# Patient Record
Sex: MALE | Race: WHITE | Employment: FULL TIME | URBAN - NONMETROPOLITAN AREA
[De-identification: names, ages, dates, MRNs, and addresses within clinical notes are randomized per-mention and may not be internally consistent; named-entity substitution may affect disease eponyms.]

---

## 2022-04-22 ENCOUNTER — OFFICE VISIT (OUTPATIENT)
Age: 22
End: 2022-04-22

## 2022-04-22 VITALS
SYSTOLIC BLOOD PRESSURE: 136 MMHG | BODY MASS INDEX: 40.31 KG/M2 | TEMPERATURE: 98 F | HEIGHT: 72 IN | RESPIRATION RATE: 22 BRPM | WEIGHT: 297.6 LBS | DIASTOLIC BLOOD PRESSURE: 88 MMHG | OXYGEN SATURATION: 99 % | HEART RATE: 81 BPM

## 2022-04-22 DIAGNOSIS — A08.4 VIRAL GASTROENTERITIS: Primary | ICD-10-CM

## 2022-04-22 DIAGNOSIS — G43.009 MIGRAINE WITHOUT AURA AND WITHOUT STATUS MIGRAINOSUS, NOT INTRACTABLE: ICD-10-CM

## 2022-04-22 PROCEDURE — 99203 OFFICE O/P NEW LOW 30 MIN: CPT | Performed by: NURSE PRACTITIONER

## 2022-04-22 ASSESSMENT — ENCOUNTER SYMPTOMS
VOMITING: 1
COLOR CHANGE: 0
WHEEZING: 0
SORE THROAT: 0
NAUSEA: 1
EYE ITCHING: 0
BLOOD IN STOOL: 0
CONSTIPATION: 0
EYE DISCHARGE: 0
ABDOMINAL PAIN: 0
SHORTNESS OF BREATH: 0
COUGH: 0
RHINORRHEA: 0
SINUS PRESSURE: 0
DIARRHEA: 0

## 2022-04-22 NOTE — PATIENT INSTRUCTIONS
Patient Education        Migraine Headache: Care Instructions  Overview     Migraines are painful, throbbing headaches that often start on one side of the head. They may cause nausea and vomiting and make you sensitive to light,sound, or smell. Without treatment, migraines can last from 4 hours to a few days. Medicines can help prevent migraines or stop them after they have started. Your doctor canhelp you find which ones work best for you. Follow-up care is a key part of your treatment and safety. Be sure to make and go to all appointments, and call your doctor if you are having problems. It's also a good idea to know your test results and keep alist of the medicines you take. How can you care for yourself at home?  Do not drive if you have taken a prescription pain medicine.  Rest in a quiet, dark room until your headache is gone. Close your eyes, and try to relax or go to sleep. Don't watch TV or read.  Put a cold, moist cloth or cold pack on the painful area for 10 to 20 minutes at a time. Put a thin cloth between the cold pack and your skin.  Use a warm, moist towel or a heating pad set on low to relax tight shoulder and neck muscles.  Have someone gently massage your neck and shoulders.  Take your medicines exactly as prescribed. Call your doctor if you think you are having a problem with your medicine. You will get more details on the specific medicines your doctor prescribes.  Don't take medicine for headache pain too often. Talk to your doctor if you are taking medicine more than 2 days a week to stop a headache. Taking too much pain medicine can lead to more headaches. These are called medicine-overuse headaches. To prevent migraines   Keep a headache diary so you can figure out what triggers your headaches. Avoiding triggers may help you prevent headaches. Record when each headache began, how long it lasted, and what the pain was like.  Write down any other symptoms you had with the headache, such as nausea, flashing lights or dark spots, or sensitivity to bright light or loud noise. Note if the headache occurred near your period. List anything that might have triggered the headache. Triggers may include certain foods (chocolate, cheese, wine) or odors, smoke, bright light, stress, or lack of sleep.  If your doctor has prescribed medicine for your migraines, take it as directed. You may have medicine that you take only when you get a migraine and medicine that you take all the time to help prevent migraines. ? If your doctor has prescribed medicine for when you get a headache, take it at the first sign of a migraine, unless your doctor has given you other instructions. ? If your doctor has prescribed medicine to prevent migraines, take it exactly as prescribed. Call your doctor if you think you are having a problem with your medicine.  Find healthy ways to deal with stress. Migraines are most common during or right after stressful times. Try finding ways to reduce stress like practicing mindfulness or deep breathing exercises.  Get plenty of sleep and exercise. But be careful to not push yourself too hard during exercise. It may trigger a headache.  Eat meals on a regular schedule. Avoid foods and drinks that often trigger migraines. These include chocolate, alcohol (especially red wine and port), aspartame, monosodium glutamate (MSG), and some additives found in foods (such as hot dogs, doyle, cold cuts, aged cheeses, and pickled foods).  Limit caffeine. Don't drink too much coffee, tea, or soda. But don't quit caffeine suddenly. That can also give you migraines.  Do not smoke or allow others to smoke around you. If you need help quitting, talk to your doctor about stop-smoking programs and medicines. These can increase your chances of quitting for good.    If you are taking birth control pills or hormone therapy, talk to your doctor about whether they are triggering your migraines. When should you call for help? Call 911 anytime you think you may need emergency care. For example, call if:     You have signs of a stroke. These may include:  ? Sudden numbness, paralysis, or weakness in your face, arm, or leg, especially on only one side of your body. ? Sudden vision changes. ? Sudden trouble speaking. ? Sudden confusion or trouble understanding simple statements. ? Sudden problems with walking or balance. ? A sudden, severe headache that is different from past headaches. Call your doctor now or seek immediate medical care if:     You have new or worse nausea and vomiting.      You have a new or higher fever.      Your headache gets much worse. Watch closely for changes in your health, and be sure to contact your doctor if:     You are not getting better after 2 days (48 hours). Where can you learn more? Go to https://ScoutmobpesimpleFLOORSeweb.InHiro. org and sign in to your [employer] account. Enter Y982 in the Yard Club box to learn more about \"Migraine Headache: Care Instructions. \"     If you do not have an account, please click on the \"Sign Up Now\" link. Current as of: December 13, 2021               Content Version: 13.2  © 5858-4273 [employer]. Care instructions adapted under license by [facility] ([facility]). If you have questions about a medical condition or this instruction, always ask your healthcare professional. [place] [address] any warranty or liability for your use of this information. Patient Education        Gastroenteritis: Care Instructions  Overview     Gastroenteritis is an illness that may cause nausea, vomiting, and diarrhea. Itcan be caused by bacteria or a virus. You will probably begin to feel better in 1 to 2 days. In the meantime, get plenty of rest and make sure you do not become dehydrated. Dehydration occurswhen your body loses too much fluid.   Follow-up care is a key part of your treatment and safety. Be sure to make and go to all appointments, and call your doctor if you are having problems. It's also a good idea to know your test results and keep alist of the medicines you take. How can you care for yourself at home?  If your doctor prescribed antibiotics, take them as directed. Do not stop taking them just because you feel better. You need to take the full course of antibiotics.  Drink plenty of fluids to prevent dehydration. Choose water and other clear liquids until you feel better. If you have kidney, heart, or liver disease and have to limit fluids, talk with your doctor before you increase your fluid intake.  Drink fluids slowly, in frequent, small amounts, because drinking too much too fast can cause vomiting.  Begin eating mild foods, such as dry toast, yogurt, applesauce, bananas, and rice. Avoid spicy, hot, or high-fat foods, and do not drink alcohol or caffeine for a day or two. Do not drink milk or eat ice cream until you are feeling better. How to prevent gastroenteritis   Keep hot foods hot and cold foods cold.  Do not eat meats, dressings, salads, or other foods that have been kept at room temperature for more than 2 hours.  Use a thermometer to check your refrigerator. It should be between 34°F and 40°F.   Defrost meats in the refrigerator or microwave, not on the kitchen counter.  Keep your hands and your kitchen clean. Wash your hands, cutting boards, and countertops with hot soapy water frequently.  Cook meat until it is well done.  Do not eat raw eggs or uncooked sauces made with raw eggs.  Do not take chances. If food looks or tastes spoiled, throw it out. When should you call for help? Call 911 anytime you think you may need emergency care. For example, call if:     You vomit blood or what looks like coffee grounds.      You passed out (lost consciousness).      You pass maroon or very bloody stools.    Call your doctor now or seek immediate medical care if:     You have severe belly pain.      You have signs of needing more fluids. You have sunken eyes, a dry mouth, and pass only a little urine.      You feel like you are going to faint.      You have increased belly pain that does not go away in 1 to 2 days.      You have new or increased nausea, or you are vomiting.      You have a new or higher fever.      Your stools are black and tarlike or have streaks of blood. Watch closely for changes in your health, and be sure to contact your doctor if:     You are dizzy or lightheaded.      You urinate less than usual, or your urine is dark yellow or brown.      You do not feel better with each day that goes by. Where can you learn more? Go to https://Tasqe.[employer]. org and sign in to your Cartagenia account. Enter N142 in the Protagen box to learn more about \"Gastroenteritis: Care Instructions. \"     If you do not have an account, please click on the \"Sign Up Now\" link. Current as of: July 1, 2021               Content Version: 13.2  © 9882-8799 [employer]. Care instructions adapted under license by [facility] ([facility]). If you have questions about a medical condition or this instruction, always ask your healthcare professional. [address] any warranty or liability for your use of this information.       - Take excedrin as needed for headache  - If applicable, do not take any medication like immodium to stop diarrhea. It needs to run its course. - Take clear liquids until no more vomiting for 4-6 hours  - Advance to BRAT (bananas, rice, applesauce and toast) as tolerated.    - If patient is not improving or developing any new/worsening symptoms then return to clinic as needed or follow up with PCP

## 2022-04-22 NOTE — LETTER
[facility]  [id number]  Phone: [phone]  Fax: LEISA Rosario - CNP        April 22, 2022     Patient: Desire Dahl   YOB: 2000   Date of Visit: 4/22/2022       To Whom it May Concern:    Desire Dahl was seen in my clinic on 4/22/2022. He may return to work on 4/23/2022. If you have any questions or concerns, please don't hesitate to call.     Sincerely,         LEISA Gutiérrez - CNP

## 2022-04-22 NOTE — PROGRESS NOTES
[id number]  [address] [facility] [id number]  Dept: [phone]  Dept Fax: [phone]  Loc: [phone]    Gustabo Finley is a 23 y.o. male who presents today for his medical conditions/complaints as noted below. Gustabo Finley is complaining of Emesis and Headache (patient states he just took 1000 mg ibuprofen 2 hours ago)        HPI:   Migraine   This is a new problem. The current episode started in the past 7 days (3 days ago). The problem occurs constantly. The problem has been resolved. The pain is located in the right unilateral region. The pain does not radiate. The pain quality is similar to prior headaches. The quality of the pain is described as throbbing. The pain is moderate. Associated symptoms include nausea and vomiting. Pertinent negatives include no abdominal pain, coughing, dizziness, ear pain, fever, numbness, rhinorrhea, sinus pressure, sore throat or weakness. The symptoms are aggravated by bright light. He has tried acetaminophen and NSAIDs for the symptoms. The treatment provided mild relief. Nausea & Vomiting  This is a new problem. The current episode started in the past 7 days (3 days ago). The problem occurs 2 to 4 times per day. The problem has been resolved. Associated symptoms include headaches, nausea and vomiting. Pertinent negatives include no abdominal pain, chest pain, chills, congestion, coughing, fatigue, fever, myalgias, numbness, rash, sore throat or weakness. The symptoms are aggravated by eating. He has tried rest for the symptoms. The treatment provided mild relief. Symptoms have all resolved; patient just needs an excuse to return to work on Monday. Past Medical History:   Diagnosis Date    Headache        Past Surgical History:   Procedure Laterality Date    ARM SURGERY         History reviewed. No pertinent family history.     Social History     Tobacco Use    Smoking status: Never Smoker  Smokeless tobacco: Current User     Types: Snuff   Substance Use Topics    Alcohol use: Yes        No current outpatient medications on file. No current facility-administered medications for this visit. Allergies   Allergen Reactions    Cefaclor Other (See Comments)     Reaction occurred as a child, does not remember       Health Maintenance   Topic Date Due    Varicella vaccine (1 of 2 - 2-dose childhood series) Never done    COVID-19 Vaccine (1) Never done    HPV vaccine (1 - Male 2-dose series) Never done    Depression Screen  Never done    HIV screen  Never done    Hepatitis C screen  Never done    DTaP/Tdap/Td vaccine (1 - Tdap) Never done    Flu vaccine (Season Ended) 09/01/2022    Hepatitis A vaccine  Aged Out    Hepatitis B vaccine  Aged Out    Hib vaccine  Aged Out    Meningococcal (ACWY) vaccine  Aged Out    Pneumococcal 0-64 years Vaccine  Aged Out       Subjective:   Review of Systems   Constitutional: Negative for activity change, appetite change, chills, fatigue and fever. HENT: Negative for congestion, ear pain, rhinorrhea, sinus pressure and sore throat. Eyes: Negative for discharge and itching. Respiratory: Negative for cough, shortness of breath and wheezing. Cardiovascular: Negative for chest pain. Gastrointestinal: Positive for nausea and vomiting. Negative for abdominal pain, blood in stool, constipation and diarrhea. Musculoskeletal: Negative for myalgias. Skin: Negative for color change and rash. Neurological: Positive for headaches. Negative for dizziness, syncope, facial asymmetry, speech difficulty, weakness, light-headedness and numbness. All other systems reviewed and are negative. Objective    Physical Exam  Vitals and nursing note reviewed. Constitutional:       General: He is not in acute distress. Appearance: Normal appearance. HENT:      Head: Normocephalic and atraumatic.       Mouth/Throat:      Mouth: Mucous membranes are moist.      Pharynx: No posterior oropharyngeal erythema. Eyes:      Extraocular Movements: Extraocular movements intact. Pupils: Pupils are equal, round, and reactive to light. Cardiovascular:      Rate and Rhythm: Normal rate and regular rhythm. Pulses: Normal pulses. Heart sounds: Normal heart sounds. No murmur heard. Pulmonary:      Effort: Pulmonary effort is normal. No respiratory distress. Breath sounds: Normal breath sounds. No wheezing. Abdominal:      General: Bowel sounds are normal.      Palpations: Abdomen is soft. Tenderness: There is no abdominal tenderness. There is no guarding or rebound. Skin:     General: Skin is warm. Capillary Refill: Capillary refill takes less than 2 seconds. Coloration: Skin is not pale. Findings: No rash. Neurological:      General: No focal deficit present. Mental Status: He is alert and oriented to person, place, and time. Psychiatric:         Attention and Perception: Attention normal.         Mood and Affect: Mood normal.         Behavior: Behavior normal. Behavior is cooperative. Thought Content: Thought content normal.         /88   Pulse 81   Temp 98 °F (36.7 °C)   Resp 22   Ht 6' (1.829 m)   Wt 297 lb 9.6 oz (135 kg)   SpO2 99%   BMI 40.36 kg/m²     Assessment         Diagnosis Orders   1. Viral gastroenteritis     2. Migraine without aura and without status migrainosus, not intractable         Plan   - Take excedrin as needed for headache  - If applicable, do not take any medication like immodium to stop diarrhea. It needs to run its course. - Take clear liquids until no more vomiting for 4-6 hours  - Advance to BRAT (bananas, rice, applesauce and toast) as tolerated. - If patient is not improving or developing any new/worsening symptoms then return to clinic as needed or follow up with PCP    Return if symptoms worsen or fail to improve.          Discussed use, benefits, and side effects of any prescribed medications. All patient questions were answered. Patient voiced understanding of care plan. Patient was given educational materials - see patient instructions below. Patient Instructions       Patient Education        Migraine Headache: Care Instructions  Overview     Migraines are painful, throbbing headaches that often start on one side of the head. They may cause nausea and vomiting and make you sensitive to light,sound, or smell. Without treatment, migraines can last from 4 hours to a few days. Medicines can help prevent migraines or stop them after they have started. Your doctor canhelp you find which ones work best for you. Follow-up care is a key part of your treatment and safety. Be sure to make and go to all appointments, and call your doctor if you are having problems. It's also a good idea to know your test results and keep alist of the medicines you take. How can you care for yourself at home?  Do not drive if you have taken a prescription pain medicine.  Rest in a quiet, dark room until your headache is gone. Close your eyes, and try to relax or go to sleep. Don't watch TV or read.  Put a cold, moist cloth or cold pack on the painful area for 10 to 20 minutes at a time. Put a thin cloth between the cold pack and your skin.  Use a warm, moist towel or a heating pad set on low to relax tight shoulder and neck muscles.  Have someone gently massage your neck and shoulders.  Take your medicines exactly as prescribed. Call your doctor if you think you are having a problem with your medicine. You will get more details on the specific medicines your doctor prescribes.  Don't take medicine for headache pain too often. Talk to your doctor if you are taking medicine more than 2 days a week to stop a headache. Taking too much pain medicine can lead to more headaches. These are called medicine-overuse headaches.   To prevent migraines   Keep a headache diary so you can figure out what triggers your headaches. Avoiding triggers may help you prevent headaches. Record when each headache began, how long it lasted, and what the pain was like. Write down any other symptoms you had with the headache, such as nausea, flashing lights or dark spots, or sensitivity to bright light or loud noise. Note if the headache occurred near your period. List anything that might have triggered the headache. Triggers may include certain foods (chocolate, cheese, wine) or odors, smoke, bright light, stress, or lack of sleep.  If your doctor has prescribed medicine for your migraines, take it as directed. You may have medicine that you take only when you get a migraine and medicine that you take all the time to help prevent migraines. ? If your doctor has prescribed medicine for when you get a headache, take it at the first sign of a migraine, unless your doctor has given you other instructions. ? If your doctor has prescribed medicine to prevent migraines, take it exactly as prescribed. Call your doctor if you think you are having a problem with your medicine.  Find healthy ways to deal with stress. Migraines are most common during or right after stressful times. Try finding ways to reduce stress like practicing mindfulness or deep breathing exercises.  Get plenty of sleep and exercise. But be careful to not push yourself too hard during exercise. It may trigger a headache.  Eat meals on a regular schedule. Avoid foods and drinks that often trigger migraines. These include chocolate, alcohol (especially red wine and port), aspartame, monosodium glutamate (MSG), and some additives found in foods (such as hot dogs, doyle, cold cuts, aged cheeses, and pickled foods).  Limit caffeine. Don't drink too much coffee, tea, or soda. But don't quit caffeine suddenly. That can also give you migraines.  Do not smoke or allow others to smoke around you.  If you need help quitting, talk to your doctor about stop-smoking programs and medicines. These can increase your chances of quitting for good.  If you are taking birth control pills or hormone therapy, talk to your doctor about whether they are triggering your migraines. When should you call for help? Call 911 anytime you think you may need emergency care. For example, call if:     You have signs of a stroke. These may include:  ? Sudden numbness, paralysis, or weakness in your face, arm, or leg, especially on only one side of your body. ? Sudden vision changes. ? Sudden trouble speaking. ? Sudden confusion or trouble understanding simple statements. ? Sudden problems with walking or balance. ? A sudden, severe headache that is different from past headaches. Call your doctor now or seek immediate medical care if:     You have new or worse nausea and vomiting.      You have a new or higher fever.      Your headache gets much worse. Watch closely for changes in your health, and be sure to contact your doctor if:     You are not getting better after 2 days (48 hours). Where can you learn more? Go to https://JiaThis.Saborstudio. org and sign in to your [employer] account. Enter L175 in the [employer] box to learn more about \"Migraine Headache: Care Instructions. \"     If you do not have an account, please click on the \"Sign Up Now\" link. Current as of: December 13, 2021               Content Version: 13.2  © 1768-6578 Healthwise, Incorporated. Care instructions adapted under license by [facility] ([facility]). If you have questions about a medical condition or this instruction, always ask your healthcare professional. [place] [address] any warranty or liability for your use of this information. Patient Education        Gastroenteritis: Care Instructions  Overview     Gastroenteritis is an illness that may cause nausea, vomiting, and diarrhea. Itcan be caused by bacteria or a virus.   You will probably begin to feel better in 1 to 2 days. In the meantime, get plenty of rest and make sure you do not become dehydrated. Dehydration occurswhen your body loses too much fluid. Follow-up care is a key part of your treatment and safety. Be sure to make and go to all appointments, and call your doctor if you are having problems. It's also a good idea to know your test results and keep alist of the medicines you take. How can you care for yourself at home?  If your doctor prescribed antibiotics, take them as directed. Do not stop taking them just because you feel better. You need to take the full course of antibiotics.  Drink plenty of fluids to prevent dehydration. Choose water and other clear liquids until you feel better. If you have kidney, heart, or liver disease and have to limit fluids, talk with your doctor before you increase your fluid intake.  Drink fluids slowly, in frequent, small amounts, because drinking too much too fast can cause vomiting.  Begin eating mild foods, such as dry toast, yogurt, applesauce, bananas, and rice. Avoid spicy, hot, or high-fat foods, and do not drink alcohol or caffeine for a day or two. Do not drink milk or eat ice cream until you are feeling better. How to prevent gastroenteritis   Keep hot foods hot and cold foods cold.  Do not eat meats, dressings, salads, or other foods that have been kept at room temperature for more than 2 hours.  Use a thermometer to check your refrigerator. It should be between 34°F and 40°F.   Defrost meats in the refrigerator or microwave, not on the kitchen counter.  Keep your hands and your kitchen clean. Wash your hands, cutting boards, and countertops with hot soapy water frequently.  Cook meat until it is well done.  Do not eat raw eggs or uncooked sauces made with raw eggs.  Do not take chances. If food looks or tastes spoiled, throw it out. When should you call for help?    Call 911 anytime you think you may need emergency care. For example, call if:     You vomit blood or what looks like coffee grounds.      You passed out (lost consciousness).      You pass maroon or very bloody stools. Call your doctor now or seek immediate medical care if:     You have severe belly pain.      You have signs of needing more fluids. You have sunken eyes, a dry mouth, and pass only a little urine.      You feel like you are going to faint.      You have increased belly pain that does not go away in 1 to 2 days.      You have new or increased nausea, or you are vomiting.      You have a new or higher fever.      Your stools are black and tarlike or have streaks of blood. Watch closely for changes in your health, and be sure to contact your doctor if:     You are dizzy or lightheaded.      You urinate less than usual, or your urine is dark yellow or brown.      You do not feel better with each day that goes by. Where can you learn more? Go to https://Legal Shine.Interface21. org and sign in to your [employer] account. Enter N142 in the [employer] box to learn more about \"Gastroenteritis: Care Instructions. \"     If you do not have an account, please click on the \"Sign Up Now\" link. Current as of: July 1, 2021               Content Version: 13.2  © 2006-2022 Healthwise, Incorporated. Care instructions adapted under license by [facility] ([facility]). If you have questions about a medical condition or this instruction, always ask your healthcare professional. [place] [address] any warranty or liability for your use of this information.       - Take excedrin as needed for headache  - If applicable, do not take any medication like immodium to stop diarrhea. It needs to run its course. - Take clear liquids until no more vomiting for 4-6 hours  - Advance to BRAT (bananas, rice, applesauce and toast) as tolerated.    - If patient is not improving or developing any new/worsening symptoms then return to clinic as needed or follow up with PCP            Electronically signed by LEISA Kelley CNP on 4/22/2022 at 9:40 AM